# Patient Record
Sex: MALE
[De-identification: names, ages, dates, MRNs, and addresses within clinical notes are randomized per-mention and may not be internally consistent; named-entity substitution may affect disease eponyms.]

---

## 2022-07-13 PROBLEM — Z00.00 ENCOUNTER FOR PREVENTIVE HEALTH EXAMINATION: Status: ACTIVE | Noted: 2022-07-13

## 2022-07-14 ENCOUNTER — APPOINTMENT (OUTPATIENT)
Dept: ORTHOPEDIC SURGERY | Facility: CLINIC | Age: 77
End: 2022-07-14

## 2022-07-14 DIAGNOSIS — M17.11 UNILATERAL PRIMARY OSTEOARTHRITIS, RIGHT KNEE: ICD-10-CM

## 2022-07-14 PROCEDURE — J3490M: CUSTOM

## 2022-07-14 PROCEDURE — 73564 X-RAY EXAM KNEE 4 OR MORE: CPT | Mod: RT

## 2022-07-14 PROCEDURE — 20610 DRAIN/INJ JOINT/BURSA W/O US: CPT | Mod: RT

## 2022-07-14 PROCEDURE — 99204 OFFICE O/P NEW MOD 45 MIN: CPT | Mod: 25

## 2022-07-14 NOTE — IMAGING

## 2022-07-14 NOTE — ASSESSMENT
[FreeTextEntry1] : RightX-Ray Examination of the KNEE (4 views): mod-severe medial and patellofemoral degenerate changes. chondrocalcinosis\par \par - We discussed their diagnosis and treatment options at length including surgical and non-surgical options.\par - We will continue conservative treatment with activity modification, PT, icing, weight loss, and anti-inflammatory medications.\par - The patient was provided with a PT prescription to work on ROM, hip ER/abductors strengthening, quad/hamstring stretches and strengthening, and other exercises \par - The patient was advised to let pain guide the gradual advancement of activities. \par - We also discussed the possible of a corticosteroid injection in order to help decrease inflammation and pain so that they can perform better therapy.\par - The risks, benefits, and alternatives to corticosteroid injection were reviewed with the patient and they wished to proceed with this treatment course. \par - Follow up as needed in 6 weeks to re-evaluate, if no improvement we spoke about possibility of viscosupplementation injections\par (may eventually need TKA)\par \par

## 2022-07-14 NOTE — HISTORY OF PRESENT ILLNESS
[de-identified] : 76 year old male  (Retired) right knee pain since  7/13/2022 when pt. slipped in home and knee bent backward awkwardly.  pain located posterior and lateral with associated stiffness, swelling.  pain worse with weight bearing activity, better at rest.  has tried Tylenol and icing with some relief.  given MDP by Primary.\par PMHx gout, cardaic issues\par \par **pt. on blood thinners, can't take NSAIDs**\par

## 2023-06-28 ENCOUNTER — APPOINTMENT (OUTPATIENT)
Dept: ENDOCRINOLOGY | Facility: CLINIC | Age: 78
End: 2023-06-28
Payer: MEDICARE

## 2023-06-28 VITALS
SYSTOLIC BLOOD PRESSURE: 120 MMHG | WEIGHT: 244 LBS | TEMPERATURE: 97 F | OXYGEN SATURATION: 88 % | HEIGHT: 72 IN | HEART RATE: 67 BPM | BODY MASS INDEX: 33.05 KG/M2 | DIASTOLIC BLOOD PRESSURE: 80 MMHG

## 2023-06-28 DIAGNOSIS — Z95.2 PRESENCE OF PROSTHETIC HEART VALVE: ICD-10-CM

## 2023-06-28 DIAGNOSIS — E55.9 VITAMIN D DEFICIENCY, UNSPECIFIED: ICD-10-CM

## 2023-06-28 DIAGNOSIS — E66.9 OBESITY, UNSPECIFIED: ICD-10-CM

## 2023-06-28 DIAGNOSIS — Z86.79 PERSONAL HISTORY OF OTHER DISEASES OF THE CIRCULATORY SYSTEM: ICD-10-CM

## 2023-06-28 DIAGNOSIS — I25.10 ATHEROSCLEROTIC HEART DISEASE OF NATIVE CORONARY ARTERY W/OUT ANGINA PECTORIS: ICD-10-CM

## 2023-06-28 DIAGNOSIS — F17.200 NICOTINE DEPENDENCE, UNSPECIFIED, UNCOMPLICATED: ICD-10-CM

## 2023-06-28 DIAGNOSIS — E04.1 NONTOXIC SINGLE THYROID NODULE: ICD-10-CM

## 2023-06-28 DIAGNOSIS — Z78.9 OTHER SPECIFIED HEALTH STATUS: ICD-10-CM

## 2023-06-28 PROCEDURE — 99204 OFFICE O/P NEW MOD 45 MIN: CPT | Mod: 25

## 2023-06-28 PROCEDURE — 36415 COLL VENOUS BLD VENIPUNCTURE: CPT

## 2023-07-03 LAB
T3FREE SERPL-MCNC: 3.28 PG/ML
T4 FREE SERPL-MCNC: 1.2 NG/DL
TSH SERPL-ACNC: 1.71 UIU/ML

## 2023-07-09 PROBLEM — E04.1 NODULAR THYROID DISEASE: Status: ACTIVE | Noted: 2023-06-28

## 2023-07-09 PROBLEM — E55.9 VITAMIN D DEFICIENCY: Status: ACTIVE | Noted: 2023-06-28

## 2023-07-09 PROBLEM — I25.10 CAD (CORONARY ARTERY DISEASE): Status: ACTIVE | Noted: 2023-07-09

## 2023-07-09 PROBLEM — Z95.2 S/P AVR: Status: RESOLVED | Noted: 2023-07-09 | Resolved: 2023-07-09

## 2023-07-09 PROBLEM — Z78.9 CONSUMES ALCOHOL OCCASIONALLY: Status: ACTIVE | Noted: 2023-07-09

## 2023-07-09 PROBLEM — F17.200 CURRENT SOME DAY SMOKER: Status: ACTIVE | Noted: 2023-07-09

## 2023-07-09 PROBLEM — E66.9 OBESITY: Status: ACTIVE | Noted: 2023-07-09

## 2023-07-09 PROBLEM — Z86.79 HISTORY OF CORONARY ARTERY DISEASE: Status: RESOLVED | Noted: 2023-07-09 | Resolved: 2023-07-09

## 2023-07-09 NOTE — HISTORY OF PRESENT ILLNESS
[FreeTextEntry1] : Mr. ANTON  is a 77 year  year old male  who presents for initial endocrine evaluation. He presents with regard to a history of nodular thyroid  He presents via the courtesy of Dr. Anderson in [place]. . 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  Still does follow with nephrologist.Dr. Fortune\par On D3 2000 iu daily.\par

## 2023-07-09 NOTE — ADDENDUM
[FreeTextEntry1] : I have encouraged  Mr. ANTON  to continue follow up with Dr. Anderson  and I will to keep Dr. Anderson  updated along the way.\par \par \par \par Note: Blood was drawn in the office today for laboratory evaluation.\par